# Patient Record
Sex: MALE | Race: WHITE | NOT HISPANIC OR LATINO | Employment: UNEMPLOYED | URBAN - METROPOLITAN AREA
[De-identification: names, ages, dates, MRNs, and addresses within clinical notes are randomized per-mention and may not be internally consistent; named-entity substitution may affect disease eponyms.]

---

## 2023-04-27 ENCOUNTER — OFFICE VISIT (OUTPATIENT)
Dept: FAMILY MEDICINE | Facility: CLINIC | Age: 83
End: 2023-04-27
Payer: COMMERCIAL

## 2023-04-27 VITALS
DIASTOLIC BLOOD PRESSURE: 72 MMHG | HEIGHT: 72 IN | SYSTOLIC BLOOD PRESSURE: 166 MMHG | WEIGHT: 176 LBS | BODY MASS INDEX: 23.84 KG/M2 | RESPIRATION RATE: 16 BRPM | HEART RATE: 76 BPM | TEMPERATURE: 97.9 F | OXYGEN SATURATION: 98 %

## 2023-04-27 DIAGNOSIS — H54.3 DECREASED VISION IN BOTH EYES: ICD-10-CM

## 2023-04-27 DIAGNOSIS — Z12.5 SCREENING FOR PROSTATE CANCER: ICD-10-CM

## 2023-04-27 DIAGNOSIS — Z13.228 SCREENING FOR ENDOCRINE, NUTRITIONAL, METABOLIC AND IMMUNITY DISORDER: ICD-10-CM

## 2023-04-27 DIAGNOSIS — Z13.21 SCREENING FOR ENDOCRINE, NUTRITIONAL, METABOLIC AND IMMUNITY DISORDER: ICD-10-CM

## 2023-04-27 DIAGNOSIS — Z00.00 ENCOUNTER FOR MEDICARE ANNUAL WELLNESS EXAM: Primary | ICD-10-CM

## 2023-04-27 DIAGNOSIS — N40.1 BENIGN NON-NODULAR PROSTATIC HYPERPLASIA WITH LOWER URINARY TRACT SYMPTOMS: ICD-10-CM

## 2023-04-27 DIAGNOSIS — I10 ESSENTIAL HYPERTENSION: ICD-10-CM

## 2023-04-27 DIAGNOSIS — Z13.29 SCREENING FOR ENDOCRINE, NUTRITIONAL, METABOLIC AND IMMUNITY DISORDER: ICD-10-CM

## 2023-04-27 DIAGNOSIS — Z13.0 SCREENING FOR ENDOCRINE, NUTRITIONAL, METABOLIC AND IMMUNITY DISORDER: ICD-10-CM

## 2023-04-27 LAB
ERYTHROCYTE [DISTWIDTH] IN BLOOD BY AUTOMATED COUNT: 12.8 % (ref 10–15)
HBA1C MFR BLD: 6 % (ref 0–5.6)
HCT VFR BLD AUTO: 46.6 % (ref 40–53)
HGB BLD-MCNC: 14.8 G/DL (ref 13.3–17.7)
MCH RBC QN AUTO: 29 PG (ref 26.5–33)
MCHC RBC AUTO-ENTMCNC: 31.8 G/DL (ref 31.5–36.5)
MCV RBC AUTO: 91 FL (ref 78–100)
PLATELET # BLD AUTO: 112 10E3/UL (ref 150–450)
RBC # BLD AUTO: 5.1 10E6/UL (ref 4.4–5.9)
WBC # BLD AUTO: 5 10E3/UL (ref 4–11)

## 2023-04-27 PROCEDURE — 84153 ASSAY OF PSA TOTAL: CPT | Performed by: FAMILY MEDICINE

## 2023-04-27 PROCEDURE — 99387 INIT PM E/M NEW PAT 65+ YRS: CPT | Performed by: FAMILY MEDICINE

## 2023-04-27 PROCEDURE — 80061 LIPID PANEL: CPT | Performed by: FAMILY MEDICINE

## 2023-04-27 PROCEDURE — 83036 HEMOGLOBIN GLYCOSYLATED A1C: CPT | Performed by: FAMILY MEDICINE

## 2023-04-27 PROCEDURE — 85027 COMPLETE CBC AUTOMATED: CPT | Performed by: FAMILY MEDICINE

## 2023-04-27 PROCEDURE — 99214 OFFICE O/P EST MOD 30 MIN: CPT | Mod: 25 | Performed by: FAMILY MEDICINE

## 2023-04-27 PROCEDURE — 84443 ASSAY THYROID STIM HORMONE: CPT | Performed by: FAMILY MEDICINE

## 2023-04-27 PROCEDURE — 36415 COLL VENOUS BLD VENIPUNCTURE: CPT | Performed by: FAMILY MEDICINE

## 2023-04-27 PROCEDURE — 80053 COMPREHEN METABOLIC PANEL: CPT | Performed by: FAMILY MEDICINE

## 2023-04-27 RX ORDER — DOXAZOSIN 2 MG/1
1 TABLET ORAL AT BEDTIME
COMMUNITY
Start: 2023-02-15 | End: 2023-04-27 | Stop reason: ALTCHOICE

## 2023-04-27 RX ORDER — AMLODIPINE BESYLATE 5 MG/1
1 TABLET ORAL EVERY MORNING
COMMUNITY
Start: 2023-02-15 | End: 2023-04-28

## 2023-04-27 RX ORDER — FINASTERIDE 5 MG/1
1 TABLET, FILM COATED ORAL EVERY MORNING
COMMUNITY
Start: 2023-02-15 | End: 2023-05-25

## 2023-04-27 RX ORDER — TAMSULOSIN HYDROCHLORIDE 0.4 MG/1
0.4 CAPSULE ORAL DAILY
Qty: 90 CAPSULE | Refills: 1 | Status: SHIPPED | OUTPATIENT
Start: 2023-04-27 | End: 2023-07-24

## 2023-04-27 ASSESSMENT — ENCOUNTER SYMPTOMS
HEMATOCHEZIA: 0
SORE THROAT: 0
ABDOMINAL PAIN: 0
MYALGIAS: 1
JOINT SWELLING: 0
PALPITATIONS: 0
NAUSEA: 0
HEADACHES: 0
WEAKNESS: 0
ARTHRALGIAS: 0
DIZZINESS: 0
PARESTHESIAS: 0
CHILLS: 0
FREQUENCY: 1
COUGH: 0
NERVOUS/ANXIOUS: 0
EYE PAIN: 0
HEARTBURN: 0
CONSTIPATION: 0
DIARRHEA: 0
HEMATURIA: 0
DYSURIA: 0

## 2023-04-27 ASSESSMENT — ACTIVITIES OF DAILY LIVING (ADL): CURRENT_FUNCTION: NO ASSISTANCE NEEDED

## 2023-04-27 ASSESSMENT — PATIENT HEALTH QUESTIONNAIRE - PHQ9
SUM OF ALL RESPONSES TO PHQ QUESTIONS 1-9: 4
10. IF YOU CHECKED OFF ANY PROBLEMS, HOW DIFFICULT HAVE THESE PROBLEMS MADE IT FOR YOU TO DO YOUR WORK, TAKE CARE OF THINGS AT HOME, OR GET ALONG WITH OTHER PEOPLE: NOT DIFFICULT AT ALL
SUM OF ALL RESPONSES TO PHQ QUESTIONS 1-9: 4

## 2023-04-27 NOTE — PATIENT INSTRUCTIONS
Patient Education   Personalized Prevention Plan  You are due for the preventive services outlined below.  Your care team is available to assist you in scheduling these services.  If you have already completed any of these items, please share that information with your care team to update in your medical record.  Health Maintenance Due   Topic Date Due     ANNUAL REVIEW OF HM ORDERS  Never done     Diptheria Tetanus Pertussis (DTAP/TDAP/TD) Vaccine (1 - Tdap) Never done     Zoster (Shingles) Vaccine (1 of 2) Never done     Pneumococcal Vaccine (1 - PCV) Never done     COVID-19 Vaccine (4 - Booster for Moderna series) 01/25/2022     Flu Vaccine (1) Never done       Urinary Incontinence (Male)  We understand that gender is a spectrum. We may use gendered terms to talk about anatomy and health risk. Please use this sheet in a way that works best for you and your provider as you talk about your care.     Urinary incontinence means not being able to control the release of urine from the bladder.   Causes  Common causes of urinary incontinence in men include:    Infection    Certain medicines    Aging    Poor pelvic muscle tone    Bladder spasms    Obesity    Enlarged prostate    Trouble urinating and fully emptying the bladder (urinary retention)  Other things that can cause incontinence are:     Nervous system diseases    Diabetes    Sleep apnea    Urinary tract infections    Prostate surgery    Pelvic injury  Constipation and smoking have also been identified as risk factors.   Symptoms    Urge incontinence (overactive bladder). This is a sudden urge to urinate. It occurs even though there may not be much urine in the bladder. The need to urinate often during the night is common. It's due to overactive bladder muscles.    Stress incontinence. This is urine leakage that you can't control. It can occur with sneezing, coughing, and other actions that put stress on the bladder.    Treatment  Treatment depends on what is  causing the condition. Bladder infections are treated with antibiotics. Urinary retention is treated with a bladder catheter.   Home care  Follow these guidelines when caring for yourself at home:    Don't have any foods and drinks that may irritate the bladder. This includes:  ? Chocolate  ? Alcohol  ? Caffeine  ? Carbonated drinks  ? Acidic fruits and juices    Limit fluids to 6 to 8 cups a day.    Lose weight if you are overweight. This may reduce your symptoms.    If advised, do regular pelvic muscle-strengthening exercises such as Kegel exercises.    If needed, wear absorbent pads to catch urine. Change the pads often. This is for good hygiene and to prevent skin and bladder infections.    Bathe daily for good hygiene.    If an antibiotic was prescribed to treat a bladder infection, take it until it's finished. Keep taking it even if you are feeling better. This is to make sure your infection has cleared.    If a catheter was left in place, keep bacteria from getting into the collection bag. Don't disconnect the catheter from the collection bag.    Use a leg band to secure the catheter drainage tube, so it does not pull on the catheter. Drain the collection bag when it becomes full. To do this, use the drain spout at the bottom of the bag. Don't disconnect the bag from the catheter.    Don't pull on or try to remove a catheter. The catheter must be removed by a healthcare provider.    If you smoke, stop. Ask your provider for help if you can't do this on your own.  Follow-up care  Follow up with your healthcare provider, or as advised.  When to get medical advice  Call your healthcare provider right away if any of these occur:     Fever over 100.4 F (38 C), or as directed by your provider    Bladder pain or fullness    Belly swelling, nausea, or vomiting    Back pain    Weakness, dizziness, or fainting    If a catheter was left in place, return if:  ? The catheter falls out  ? The catheter stops draining for 6  hours  ? Your urine gets cloudy or smells bad  Gustavo last reviewed this educational content on 6/1/2022 2000-2022 [employer]. All rights reserved. This information is not intended as a substitute for professional medical care. Always follow your healthcare professional's instructions.

## 2023-04-27 NOTE — PROGRESS NOTES
"SUBJECTIVE:   Rambo is a 82 year old who presents for Preventive Visit.      4/27/2023    10:22 AM   Additional Questions   Roomed by Ana Rosa RECIO   Accompanied by self     Patient has been advised of split billing requirements and indicates understanding: Yes  Are you in the first 12 months of your Medicare coverage?  Yes,  Visual Acuity:  Right Eye: 10/50   Left Eye: 10/63  Both Eyes: 10/32    Healthy Habits:     In general, how would you rate your overall health?  Good    Frequency of exercise:  6-7 days/week    Duration of exercise:  45-60 minutes    Do you usually eat at least 4 servings of fruit and vegetables a day, include whole grains    & fiber and avoid regularly eating high fat or \"junk\" foods?  Yes    Taking medications regularly:  Yes    Medication side effects:  None    Ability to successfully perform activities of daily living:  No assistance needed    Home Safety:  No safety concerns identified    Hearing Impairment:  No hearing concerns    In the past 6 months, have you been bothered by leaking of urine? Yes    In general, how would you rate your overall mental or emotional health?  Good      PHQ-2 Total Score: 2    Additional concerns today:  No      Have you ever done Advance Care Planning? (For example, a Health Directive, POLST, or a discussion with a medical provider or your loved ones about your wishes): No, advance care planning information given to patient to review.  Patient declined advance care planning discussion at this time.    Hearing: no hearing loss  Fall risk  Fallen 2 or more times in the past year?: No  Any fall with injury in the past year?: No    Cognitive Screening   1) Repeat 3 items (Leader, Season, Table)    2) Clock draw: NORMAL  3) 3 item recall: Recalls 3 objects  Results: 3 items recalled: COGNITIVE IMPAIRMENT LESS LIKELY    Mini-CogTM Copyright S William. Licensed by the author for use in [facility]; reprinted with permission (flakito@.[facility]). All rights " reserved.      Do you have sleep apnea, excessive snoring or daytime drowsiness?: no    Reviewed and updated as needed this visit by clinical staff   Tobacco  Allergies  Meds              Reviewed and updated as needed this visit by Provider                 Social History     Tobacco Use     Smoking status: Never     Smokeless tobacco: Never   Vaping Use     Vaping status: Never Used     Passive vaping exposure: Yes   Substance Use Topics     Alcohol use: Not on file             4/27/2023    10:10 AM   Alcohol Use   Prescreen: >3 drinks/day or >7 drinks/week? No     Do you have a current opioid prescription? No  Do you use any other controlled substances or medications that are not prescribed by a provider? None        PROBLEMS TO ADD ON...  New patient, French-speaking, relocated from [facility], overall healthy, no prior surgery, current medication included doxazosin and finasteride for hypertrophic prostate, amlodipine 5 mg for hypertension has been on this regimen for several years.  Has noticed increased urination, waking up 4-5 times a night, no burning sensation, decreased vision left greater than right.  Current providers sharing in care for this patient include:   No care team member to display    The following health maintenance items are reviewed in Epic and correct as of today:  Health Maintenance   Topic Date Due     ANNUAL REVIEW OF HM ORDERS  Never done     DTAP/TDAP/TD IMMUNIZATION (1 - Tdap) Never done     ZOSTER IMMUNIZATION (1 of 2) Never done     Pneumococcal Vaccine: 65+ Years (1 - PCV) Never done     COVID-19 Vaccine (4 - Booster for Moderna series) 01/25/2022     INFLUENZA VACCINE (1) Never done     MEDICARE ANNUAL WELLNESS VISIT  04/27/2024     FALL RISK ASSESSMENT  04/27/2024     ADVANCE CARE PLANNING  04/27/2028     PHQ-2 (once per calendar year)  Completed     IPV IMMUNIZATION  Aged Out     MENINGITIS IMMUNIZATION  Aged Out     Lab work is in process  Labs reviewed in EPIC  BP  Readings from Last 3 Encounters:   04/27/23 (!) 166/72    Wt Readings from Last 3 Encounters:   04/27/23 79.8 kg (176 lb)                  Patient Active Problem List   Diagnosis     Benign non-nodular prostatic hyperplasia with lower urinary tract symptoms     Essential hypertension     No past surgical history on file.    Social History     Tobacco Use     Smoking status: Never     Smokeless tobacco: Never   Vaping Use     Vaping status: Never Used     Passive vaping exposure: Yes   Substance Use Topics     Alcohol use: Not on file     No family history on file.      Current Outpatient Medications   Medication Sig Dispense Refill     amLODIPine (NORVASC) 5 MG tablet Take 1 tablet by mouth every morning       finasteride (PROSCAR) 5 MG tablet Take 1 tablet by mouth every morning       tamsulosin (FLOMAX) 0.4 MG capsule Take 1 capsule (0.4 mg) by mouth daily 90 capsule 1     No Known Allergies  Recent Labs   Lab Test 04/27/23  1129   A1C 6.0*   *   HDL 48   TRIG 176*   ALT 20   CR 0.99   GFRESTIMATED 77   POTASSIUM 4.2   TSH 0.89          Review of Systems   Constitutional: Negative for chills.   HENT: Negative for congestion, ear pain, hearing loss and sore throat.    Eyes: Positive for visual disturbance. Negative for pain.   Respiratory: Negative for cough.    Cardiovascular: Negative for chest pain, palpitations and peripheral edema.   Gastrointestinal: Negative for abdominal pain, constipation, diarrhea, heartburn, hematochezia and nausea.   Genitourinary: Positive for frequency, impotence and urgency. Negative for dysuria, genital sores and hematuria.   Musculoskeletal: Positive for myalgias. Negative for arthralgias and joint swelling.   Skin: Negative for rash.   Neurological: Negative for dizziness, weakness, headaches and paresthesias.   Psychiatric/Behavioral: Negative for mood changes. The patient is not nervous/anxious.          OBJECTIVE:   BP (!) 166/72 (BP Location: Left arm, Patient Position:  "Sitting, Cuff Size: Adult Large)   Pulse 76   Temp 97.9  F (36.6  C) (Temporal)   Resp 16   Ht 1.82 m (5' 11.65\")   Wt 79.8 kg (176 lb)   SpO2 98%   BMI 24.10 kg/m   Estimated body mass index is 24.1 kg/m  as calculated from the following:    Height as of this encounter: 1.82 m (5' 11.65\").    Weight as of this encounter: 79.8 kg (176 lb).  Physical Exam  GENERAL: healthy, alert and no distress  EYES: PERRL and moderate lens opacification left, mild right.  HENT: ear canals and TM's normal, nose and mouth without ulcers or lesions  NECK: no adenopathy, no asymmetry, masses, or scars and thyroid normal to palpation  RESP: lungs clear to auscultation - no rales, rhonchi or wheezes  CV: regular rate and rhythm, normal S1 S2, no S3 or S4, no murmur, click or rub, no peripheral edema and peripheral pulses strong  ABDOMEN: soft, nontender, no hepatosplenomegaly, no masses and bowel sounds normal   (male): normal male genitalia without lesions or urethral discharge, no hernia  RECTAL: normal sphincter tone, no rectal masses and moderate prostate enlargement, but otherwise soft, symmetrical with no palpable nodules.  MS: no gross musculoskeletal defects noted, no edema  SKIN: no suspicious lesions or rashes  NEURO: Normal strength and tone, mentation intact and speech normal  PSYCH: mentation appears normal, affect normal/bright    Diagnostic Test Results:  Labs reviewed in Epic    ASSESSMENT / PLAN:   (Z00.00) Encounter for Medicare annual wellness exam  (primary encounter diagnosis)  Comment:   Plan:     (I10) Essential hypertension  Comment:   Plan: **Comprehensive metabolic panel FUTURE 2mo,         **CBC with platelets FUTURE 2mo, **TSH with         free T4 reflex FUTURE 2mo, Albumin Random Urine        Quantitative with Creat Ratio, UA Macroscopic         with reflex to Microscopic and Culture            (N40.1) Benign non-nodular prostatic hyperplasia with lower urinary tract symptoms  Comment:   Plan: " tamsulosin (FLOMAX) 0.4 MG capsule            (Z12.5) Screening for prostate cancer  Comment:   Plan: PSA, tumor marker            (Z13.29,  Z13.21,  Z13.228,  Z13.0) Screening for endocrine, nutritional, metabolic and immunity disorder  Comment:   Plan: Lipid panel reflex to direct LDL Fasting,         **Hemoglobin A1c FUTURE 3mo            (H54.3) Decreased vision in both eyes  Comment:   Plan: Adult Eye  Referral          Benign prostatic hypertrophy with LUTS symptoms, trial of Flomax discussed and prescribed stop doxazosin, continue finasteride.  Consider referral to urologist.  Uncontrolled hypertension, increase amlodipine to total of 10 mg daily, monitor at home recheck here in about 4 to 6 weeks.  Decreased vision with cataract left greater than right, refer to ophthalmologist.  Age-appropriate immunization discussed, including Tdap, pneumonia, influenza etc.        Patient has been advised of split billing requirements and indicates understanding: Yes      COUNSELING:  Reviewed preventive health counseling, as reflected in patient instructions       Regular exercise       Healthy diet/nutrition       Vision screening       Hearing screening       Dental care       Bladder control        He reports that he has never smoked. He has never used smokeless tobacco.      Appropriate preventive services were discussed with this patient, including applicable screening as appropriate for cardiovascular disease, diabetes, osteopenia/osteoporosis, and glaucoma.  As appropriate for age/gender, discussed screening for colorectal cancer, prostate cancer, breast cancer, and cervical cancer. Checklist reviewing preventive services available has been given to the patient.    Reviewed patients plan of care and provided an AVS. The Intermediate Care Plan ( asthma action plan, low back pain action plan, and migraine action plan) for Rambo meets the Care Plan requirement. This Care Plan has been established and  reviewed with the Patient.          Boy Toro MD  [facility]    Identified Health Risks:    I have reviewed Opioid Use Disorder and Substance Use Disorder risk factors and made any needed referrals.     Answers for HPI/ROS submitted by the patient on 4/27/2023  If you checked off any problems, how difficult have these problems made it for you to do your work, take care of things at home, or get along with other people?: Not difficult at all  PHQ9 TOTAL SCORE: 4

## 2023-04-28 LAB
ALBUMIN SERPL BCG-MCNC: 4.3 G/DL (ref 3.5–5.2)
ALP SERPL-CCNC: 61 U/L (ref 40–129)
ALT SERPL W P-5'-P-CCNC: 20 U/L (ref 10–50)
ANION GAP SERPL CALCULATED.3IONS-SCNC: 9 MMOL/L (ref 7–15)
AST SERPL W P-5'-P-CCNC: 23 U/L (ref 10–50)
BILIRUB SERPL-MCNC: 0.4 MG/DL
BUN SERPL-MCNC: 12 MG/DL (ref 8–23)
CALCIUM SERPL-MCNC: 9.6 MG/DL (ref 8.8–10.2)
CHLORIDE SERPL-SCNC: 103 MMOL/L (ref 98–107)
CHOLEST SERPL-MCNC: 212 MG/DL
CREAT SERPL-MCNC: 0.99 MG/DL (ref 0.67–1.17)
DEPRECATED HCO3 PLAS-SCNC: 28 MMOL/L (ref 22–29)
GFR SERPL CREATININE-BSD FRML MDRD: 77 ML/MIN/1.73M2
GLUCOSE SERPL-MCNC: 113 MG/DL (ref 70–99)
HDLC SERPL-MCNC: 48 MG/DL
LDLC SERPL CALC-MCNC: 129 MG/DL
NONHDLC SERPL-MCNC: 164 MG/DL
POTASSIUM SERPL-SCNC: 4.2 MMOL/L (ref 3.4–5.3)
PROT SERPL-MCNC: 7.8 G/DL (ref 6.4–8.3)
PSA SERPL DL<=0.01 NG/ML-MCNC: 2.91 NG/ML
SODIUM SERPL-SCNC: 140 MMOL/L (ref 136–145)
TRIGL SERPL-MCNC: 176 MG/DL
TSH SERPL DL<=0.005 MIU/L-ACNC: 0.89 UIU/ML (ref 0.3–4.2)

## 2023-04-28 RX ORDER — AMLODIPINE BESYLATE 10 MG/1
10 TABLET ORAL DAILY
Qty: 90 TABLET | Refills: 3 | Status: SHIPPED | OUTPATIENT
Start: 2023-04-28 | End: 2024-02-15

## 2023-05-01 ENCOUNTER — TELEPHONE (OUTPATIENT)
Dept: FAMILY MEDICINE | Facility: CLINIC | Age: 83
End: 2023-05-01
Payer: COMMERCIAL

## 2023-05-01 NOTE — TELEPHONE ENCOUNTER
General Call    Contacts       Type Contact Phone/Fax    05/01/2023 11:52 AM CDT Phone (Incoming) Rambo Monique (Self) [phone] (H)        Reason for Call:  Had some questions     What are your questions or concerns:  Pt stated he got some recommendations, blood test. Pt was asking if there is anything else that needs to be done like some type of examation or like a shot. Please advise     Date of last appointment with provider: 04/27/23    [facility] we [facility] or would you prefer to receive a phone call?:   Patient would prefer a phone call     Okay to leave a detailed message?: Yes at Home number on file [phone] (home)

## 2023-05-21 ENCOUNTER — HEALTH MAINTENANCE LETTER (OUTPATIENT)
Age: 83
End: 2023-05-21

## 2023-05-25 ENCOUNTER — MYC REFILL (OUTPATIENT)
Dept: FAMILY MEDICINE | Facility: CLINIC | Age: 83
End: 2023-05-25
Payer: COMMERCIAL

## 2023-05-25 DIAGNOSIS — N40.1 BENIGN NON-NODULAR PROSTATIC HYPERPLASIA WITH LOWER URINARY TRACT SYMPTOMS: Primary | ICD-10-CM

## 2023-05-26 NOTE — TELEPHONE ENCOUNTER
"Routing refill request to provider for review/approval because:  Medication is reported/historical    Last Written Prescription Date:  4/27/23  Last Fill Quantity: ,  # refills:    Last office visit provider:  4/27/23     Requested Prescriptions   Pending Prescriptions Disp Refills     finasteride (PROSCAR) 5 MG tablet       Sig: Take 1 tablet (5 mg) by mouth every morning       BPH Agents Passed - 5/25/2023  7:39 PM        Passed - Recent (12 mo) or future (30 days) visit within the authorizing provider's department     Patient has had an office visit with the authorizing provider or a provider within the authorizing providers department within the previous 12 mos or has a future within next 30 days. See \"Patient Info\" tab in inbasket, or \"Choose Columns\" in Meds & Orders section of the refill encounter.              Passed - Medication is active on med list        Passed - Patient is 18 years of age or older             María Nicole RN 05/26/23 4:26 PM  "

## 2023-05-30 RX ORDER — FINASTERIDE 5 MG/1
1 TABLET, FILM COATED ORAL EVERY MORNING
Qty: 90 TABLET | Refills: 3 | Status: SHIPPED | OUTPATIENT
Start: 2023-05-30 | End: 2024-02-15

## 2023-07-24 ENCOUNTER — OFFICE VISIT (OUTPATIENT)
Dept: FAMILY MEDICINE | Facility: CLINIC | Age: 83
End: 2023-07-24
Payer: COMMERCIAL

## 2023-07-24 VITALS
HEART RATE: 65 BPM | HEIGHT: 72 IN | SYSTOLIC BLOOD PRESSURE: 138 MMHG | RESPIRATION RATE: 16 BRPM | WEIGHT: 175 LBS | OXYGEN SATURATION: 98 % | TEMPERATURE: 97.7 F | DIASTOLIC BLOOD PRESSURE: 72 MMHG | BODY MASS INDEX: 23.7 KG/M2

## 2023-07-24 DIAGNOSIS — H25.9 AGE-RELATED CATARACT OF BOTH EYES, UNSPECIFIED AGE-RELATED CATARACT TYPE: ICD-10-CM

## 2023-07-24 DIAGNOSIS — Z01.818 PREOP GENERAL PHYSICAL EXAM: Primary | ICD-10-CM

## 2023-07-24 DIAGNOSIS — N40.1 BENIGN NON-NODULAR PROSTATIC HYPERPLASIA WITH LOWER URINARY TRACT SYMPTOMS: ICD-10-CM

## 2023-07-24 DIAGNOSIS — I10 ESSENTIAL HYPERTENSION: ICD-10-CM

## 2023-07-24 DIAGNOSIS — D69.6 THROMBOCYTOPENIA (H): ICD-10-CM

## 2023-07-24 LAB
ALBUMIN SERPL BCG-MCNC: 4.3 G/DL (ref 3.5–5.2)
ALBUMIN UR-MCNC: NEGATIVE MG/DL
ALP SERPL-CCNC: 65 U/L (ref 40–129)
ALT SERPL W P-5'-P-CCNC: 21 U/L (ref 0–70)
ANION GAP SERPL CALCULATED.3IONS-SCNC: 9 MMOL/L (ref 7–15)
APPEARANCE UR: CLEAR
AST SERPL W P-5'-P-CCNC: 26 U/L (ref 0–45)
ATRIAL RATE - MUSE: 60 BPM
BASOPHILS # BLD AUTO: 0 10E3/UL (ref 0–0.2)
BASOPHILS NFR BLD AUTO: 1 %
BILIRUB SERPL-MCNC: 0.4 MG/DL
BILIRUB UR QL STRIP: NEGATIVE
BUN SERPL-MCNC: 12.9 MG/DL (ref 8–23)
CALCIUM SERPL-MCNC: 9.1 MG/DL (ref 8.8–10.2)
CHLORIDE SERPL-SCNC: 101 MMOL/L (ref 98–107)
COLOR UR AUTO: YELLOW
CREAT SERPL-MCNC: 0.92 MG/DL (ref 0.67–1.17)
CREAT UR-MCNC: 62.6 MG/DL
DEPRECATED HCO3 PLAS-SCNC: 27 MMOL/L (ref 22–29)
DIASTOLIC BLOOD PRESSURE - MUSE: NORMAL MMHG
EOSINOPHIL # BLD AUTO: 0.3 10E3/UL (ref 0–0.7)
EOSINOPHIL NFR BLD AUTO: 6 %
ERYTHROCYTE [DISTWIDTH] IN BLOOD BY AUTOMATED COUNT: 13.2 % (ref 10–15)
GFR SERPL CREATININE-BSD FRML MDRD: 84 ML/MIN/1.73M2
GLUCOSE SERPL-MCNC: 100 MG/DL (ref 70–99)
GLUCOSE UR STRIP-MCNC: NEGATIVE MG/DL
HCT VFR BLD AUTO: 45.4 % (ref 40–53)
HGB BLD-MCNC: 14.9 G/DL (ref 13.3–17.7)
HGB UR QL STRIP: NEGATIVE
IMM GRANULOCYTES # BLD: 0 10E3/UL
IMM GRANULOCYTES NFR BLD: 0 %
INTERPRETATION ECG - MUSE: NORMAL
KETONES UR STRIP-MCNC: NEGATIVE MG/DL
LEUKOCYTE ESTERASE UR QL STRIP: NEGATIVE
LYMPHOCYTES # BLD AUTO: 2 10E3/UL (ref 0.8–5.3)
LYMPHOCYTES NFR BLD AUTO: 40 %
MCH RBC QN AUTO: 29.3 PG (ref 26.5–33)
MCHC RBC AUTO-ENTMCNC: 32.8 G/DL (ref 31.5–36.5)
MCV RBC AUTO: 89 FL (ref 78–100)
MICROALBUMIN UR-MCNC: <12 MG/L
MICROALBUMIN/CREAT UR: NORMAL MG/G{CREAT}
MONOCYTES # BLD AUTO: 0.5 10E3/UL (ref 0–1.3)
MONOCYTES NFR BLD AUTO: 10 %
NEUTROPHILS # BLD AUTO: 2.2 10E3/UL (ref 1.6–8.3)
NEUTROPHILS NFR BLD AUTO: 43 %
NITRATE UR QL: NEGATIVE
NRBC # BLD AUTO: 0 10E3/UL
NRBC BLD AUTO-RTO: 0 /100
P AXIS - MUSE: 43 DEGREES
PH UR STRIP: 7.5 [PH] (ref 5–8)
PLATELET # BLD AUTO: 130 10E3/UL (ref 150–450)
POTASSIUM SERPL-SCNC: 4 MMOL/L (ref 3.4–5.3)
PR INTERVAL - MUSE: 200 MS
PROT SERPL-MCNC: 7.7 G/DL (ref 6.4–8.3)
QRS DURATION - MUSE: 100 MS
QT - MUSE: 418 MS
QTC - MUSE: 418 MS
R AXIS - MUSE: 22 DEGREES
RBC # BLD AUTO: 5.09 10E6/UL (ref 4.4–5.9)
RETICS # AUTO: 0.08 10E6/UL (ref 0.01–0.11)
RETICS/RBC NFR AUTO: 1.6 % (ref 0.8–2.7)
SODIUM SERPL-SCNC: 137 MMOL/L (ref 136–145)
SP GR UR STRIP: 1.01 (ref 1–1.03)
SYSTOLIC BLOOD PRESSURE - MUSE: NORMAL MMHG
T AXIS - MUSE: 29 DEGREES
UROBILINOGEN UR STRIP-ACNC: 0.2 E.U./DL
VENTRICULAR RATE- MUSE: 60 BPM
WBC # BLD AUTO: 5 10E3/UL (ref 4–11)

## 2023-07-24 PROCEDURE — 99207 BLOOD MORPHOLOGY PATHOLOGIST REVIEW: CPT | Performed by: PATHOLOGY

## 2023-07-24 PROCEDURE — 80053 COMPREHEN METABOLIC PANEL: CPT | Performed by: FAMILY MEDICINE

## 2023-07-24 PROCEDURE — 82043 UR ALBUMIN QUANTITATIVE: CPT | Performed by: FAMILY MEDICINE

## 2023-07-24 PROCEDURE — 90714 TD VACC NO PRESV 7 YRS+ IM: CPT | Performed by: FAMILY MEDICINE

## 2023-07-24 PROCEDURE — 93010 ELECTROCARDIOGRAM REPORT: CPT | Performed by: INTERNAL MEDICINE

## 2023-07-24 PROCEDURE — 90471 IMMUNIZATION ADMIN: CPT | Performed by: FAMILY MEDICINE

## 2023-07-24 PROCEDURE — 99215 OFFICE O/P EST HI 40 MIN: CPT | Mod: 25 | Performed by: FAMILY MEDICINE

## 2023-07-24 PROCEDURE — 82570 ASSAY OF URINE CREATININE: CPT | Performed by: FAMILY MEDICINE

## 2023-07-24 PROCEDURE — 85025 COMPLETE CBC W/AUTO DIFF WBC: CPT | Performed by: FAMILY MEDICINE

## 2023-07-24 PROCEDURE — 81003 URINALYSIS AUTO W/O SCOPE: CPT | Performed by: FAMILY MEDICINE

## 2023-07-24 PROCEDURE — 85045 AUTOMATED RETICULOCYTE COUNT: CPT | Performed by: FAMILY MEDICINE

## 2023-07-24 PROCEDURE — 93005 ELECTROCARDIOGRAM TRACING: CPT | Performed by: FAMILY MEDICINE

## 2023-07-24 PROCEDURE — 36415 COLL VENOUS BLD VENIPUNCTURE: CPT | Performed by: FAMILY MEDICINE

## 2023-07-24 RX ORDER — TAMSULOSIN HYDROCHLORIDE 0.4 MG/1
0.4 CAPSULE ORAL DAILY
Qty: 90 CAPSULE | Refills: 1 | Status: SHIPPED | OUTPATIENT
Start: 2023-07-24 | End: 2024-02-15

## 2023-07-25 ENCOUNTER — PATIENT OUTREACH (OUTPATIENT)
Dept: ONCOLOGY | Facility: CLINIC | Age: 83
End: 2023-07-25
Payer: COMMERCIAL

## 2023-07-25 LAB
PATH REPORT.COMMENTS IMP SPEC: NORMAL
PATH REPORT.COMMENTS IMP SPEC: NORMAL
PATH REPORT.FINAL DX SPEC: NORMAL
PATH REPORT.RELEVANT HX SPEC: NORMAL

## 2023-07-25 NOTE — PROGRESS NOTES
Referral received for benign heme services, see below.    Referral reason: Thrombocytopenia    Current abnormal labs: Available in Chart Review    Outreach: Call not placed to patient regarding referral.    Plan: Triage instructions updated and sent to [facility] for completion.

## 2023-08-26 NOTE — Clinical Note
Please fax signed preop note to surgery center. Thank you Patient requests all Lab, Cardiology, and Radiology Results on their Discharge Instructions

## 2023-09-11 NOTE — TELEPHONE ENCOUNTER
RECORDS STATUS - ALL OTHER DIAGNOSIS      RECORDS RECEIVED FROM: [facility]   DATE RECEIVED: 9/11   NOTES STATUS DETAILS   OFFICE NOTE from referring provider Boy Daniels MD in [facility]   MEDICATION LIST [facility] 7/24/23   LABS     ANYTHING RELATED TO DIAGNOSIS Epic 7/24/23

## 2023-09-21 ENCOUNTER — VIRTUAL VISIT (OUTPATIENT)
Dept: TRANSPLANT | Facility: CLINIC | Age: 83
End: 2023-09-21
Attending: FAMILY MEDICINE
Payer: COMMERCIAL

## 2023-09-21 ENCOUNTER — PRE VISIT (OUTPATIENT)
Dept: TRANSPLANT | Facility: CLINIC | Age: 83
End: 2023-09-21
Payer: COMMERCIAL

## 2023-09-21 VITALS — BODY MASS INDEX: 21.67 KG/M2 | WEIGHT: 160 LBS | HEIGHT: 72 IN

## 2023-09-21 DIAGNOSIS — D69.6 THROMBOCYTOPENIA (H): ICD-10-CM

## 2023-09-21 PROCEDURE — 99204 OFFICE O/P NEW MOD 45 MIN: CPT | Mod: VID | Performed by: INTERNAL MEDICINE

## 2023-09-21 ASSESSMENT — PAIN SCALES - GENERAL: PAINLEVEL: NO PAIN (0)

## 2023-09-21 NOTE — LETTER
2023         RE: Rambo Monique  [address]  [place] [address]        Dear Colleague,    Thank you for referring your patient, Rambo Monique, to the [facility] [place]. Please see a copy of my visit note below.    Virtual Visit Details    Type of service:  Video Visit   Video Start Time: 3:58 PM  Video End Time:4:07 PM    Originating Location (pt. Location): Home  Distant Location (provider location):  On-site  Platform used for Video Visit: [facility]  HEMATOLOGY CONSULTATION    Rambo Monique   : 1940   MRN: [id number]  Date of service: Sep 21, 2023     REASON FOR CONSULTATION:  We are asked by Dr. Boy Toro to evaluate Rambo Monique for concern for thrombocytopenia.    HISTORY OF PRESENT ILLNESS:  Rambo Monique is a 82 year old man with a history of BPH, HTN, cataracts, who has had chronic thrombocytopenia. He is going to get a cataract surgery and a referral was made for our opinion regarding the thrombocytopenia    Per chart review and discussion with the patient, his earliest labs in the system are from [facility] and go back to . He has had low platelets 149 in  and . 117 in 2018, 130s in 2019. As low as 105 in 2021. Plts 113 in . In , he has had labs through , plts 112 In 2023 and 130 in 2023. The remainder of his CBC has been normal, with a normal retic 0.079 and an unremarkable smear. He acknowledges that he has known about the platelets for the last few years but doesn't know why.     He denies epistaxis, gum bleeding, hematemesis, hemoptysis, hematochezia, melena, hematuria.     He has not had excessive bleeding with dental procedures. He has had both cataract surgeries in August and they went very well. He is able to see again.    Diet includes meat, vegetables, dairy, and fruit. He has no dietary restrictions.      He denies fevers, chills, night sweats, weight loss, headache, chest pain, shortness of breath, nausea, vomiting, abdominal pain, constipation, diarrhea, no new weakness, numbness, peripheral neuropathy.    He is independent at home and is able to take care of his ADLs.     REVIEW OF SYSTEMS  A 10 point review of systems was performed and was otherwise negative except as mentioned in the HPI.     PAST MEDICAL HISTORY  No past medical history on file.  PAST SURGICAL HISTORY  No past surgical history on file.  SOCIAL HISTORY  Reviewed, and any changes made accordingly  Social History     Socioeconomic History    Marital status:      Spouse name: Not on file    Number of children: Not on file    Years of education: Not on file    Highest education level: Not on file   Occupational History    Not on file   Tobacco Use    Smoking status: Never    Smokeless tobacco: Never   Vaping Use    Vaping Use: Never used   Substance and Sexual Activity    Alcohol use: Not on file    Drug use: Not on file    Sexual activity: Not on file   Other Topics Concern    Not on file   Social History Narrative    Not on file     Social Determinants of Health     Financial Resource Strain: Not on file   Food Insecurity: Not on file   Transportation Needs: Not on file   Physical Activity: Not on file   Stress: Not on file   Social Connections: Not on file   Interpersonal Safety: Not on file   Housing Stability: Not on file   No taobacco no EtOH.    FAMILY HISTORY  Reviewed, and any changes made accordingly. No known family history of cancer or bleeding.  No family history on file.    MEDICATIONS  Current Outpatient Medications   Medication    amLODIPine (NORVASC) 10 MG tablet    finasteride (PROSCAR) 5 MG tablet    tamsulosin (FLOMAX) 0.4 MG capsule     No current facility-administered medications for this visit.     ALLERGIES  No Known Allergies    PHYSICAL EXAM  There were no vitals taken for this visit.   Wt Readings from Last 10  Encounters:   07/24/23 79.4 kg (175 lb)   04/27/23 79.8 kg (176 lb)     General: Well appearing.  HEENT: Sclerae anicteric.  Lungs: Breathing comfortably. No cough.  MSK: Grossly normal movement.  Neuro: Grossly non-focal.  Skin/access: Normal skin tone.  Psych: Alert and oriented. No distress.  Performance status: ECOG 0    LABS  Recent Labs   Lab Test 07/24/23  1159   WBC 5.0   HGB 14.9   *   MCV 89   RDW 13.2     Recent Labs   Lab Test 07/24/23  1159      POTASSIUM 4.0   CHLORIDE 101   CO2 27   BUN 12.9   CR 0.92   MARLENA 9.1     Recent Labs   Lab Test 07/24/23  1159 04/27/23  1129   AST 26 23   ALT 21 20   ALKPHOS 65 61   ALBUMIN 4.3 4.3   PROTTOTAL 7.7 7.8   BILITOTAL 0.4 0.4      No results for input(s): IGA, IGM, IGE, ELPM, ELPINT, IEP, KAPPAFREELT, LAMBDAFREELT, KLR in the last 25266 hours.    Invalid input(s): LGG   Recent Labs   Lab Test 07/24/23  1159   RETICABSCT 0.079   RETP 1.6     No results for input(s): MORPH in the last 96887 hours.  No results for input(s): HCVAB, HCABC, HBCAB, AUSAB, HIV in the last 31337 hours.  No results for input(s): INR, PTT, FIBR in the last 72860 hours.     IMAGING  As mentioned above in HPI.    LADI Monique is a 82 year old man with a history as above, with the following issues:  Chronic mild asymptomatic thrombocytopenia    Differential diagnosis includes a transient thrombocytopenia from any number of causes (but his has been persistent), pseudothrombocytopenia (but smear confirms counts), a mild ITP (diagnosis of exclusion), normal variation, congenital or inherited thrombocytopenia (we don't have any normal plt counts on him available), chronic viral infection (Hep B, C, HIV, EBV, CMV), nutritional deficiency (B12, folate, copper), and lupus or other autoimmunity. He does not have evidence of liver failure, hypersplenism, or chronic alcoholism. It is also possible that it signals the beginnings of a bone marrow failure or bone marrow  infiltrative process, however it has been quite stable over time. A low risk MDS is possible but this would be expectantly/symptomatically managed at his age. A peripheral blood smear was unremarkable.    PLAN  - Check repeat CBC with diff and retic count, B12, folate, copper  - If nutritional labs are normal, no further workup or treatment needed at this time.    We had a long discussion with the patient about the diagnostic possibilities and necessary workup. All questions were answered to their satisfaction.    I spent 50 minutes on the date of service reviewing medical records from the referring provider, reviewing previous lab and imaging results as summarized above, obtaining and reviewing records from [facility] as summarized above, obtaining a history from the patient, performing a physical exam, counseling and educating the patient on the diagnosis and treatment, entering orders for tests, communication with the referring provider, evaluating a problem with uncertain prognosis, coordinating care, and documenting in the electronic medical record.    Thank you for allowing me to participate in the care of this patient. Please do not hesitate to contact me if there are any concerns or questions.     Omar Luis MD   of Medicine  Classical Hematology and Blood and Marrow Transplantation  Division of Hematology, Oncology, and Transplantation  [facility] [phone]

## 2023-09-21 NOTE — PROGRESS NOTES
Virtual Visit Details    Type of service:  Video Visit   Video Start Time: 3:58 PM  Video End Time:4:07 PM    Originating Location (pt. Location): Home  Distant Location (provider location):  On-site  Platform used for Video Visit: [facility]  HEMATOLOGY CONSULTATION    Rambo Monique   : 1940   MRN: [id number]  Date of service: Sep 21, 2023     REASON FOR CONSULTATION:  We are asked by Dr. Boy Toro to evaluate Rambo Monique for concern for thrombocytopenia.    HISTORY OF PRESENT ILLNESS:  Rambo Monique is a 82 year old man with a history of BPH, HTN, cataracts, who has had chronic thrombocytopenia. He is going to get a cataract surgery and a referral was made for our opinion regarding the thrombocytopenia    Per chart review and discussion with the patient, his earliest labs in the system are from [facility] and go back to . He has had low platelets 149 in  and 2016. 117 in , 130s in . As low as 105 in 2021. Plts 113 in . In , he has had labs through [facility], plts 112 In 2023 and 130 in 2023. The remainder of his CBC has been normal, with a normal retic 0.079 and an unremarkable smear. He acknowledges that he has known about the platelets for the last few years but doesn't know why.     He denies epistaxis, gum bleeding, hematemesis, hemoptysis, hematochezia, melena, hematuria.     He has not had excessive bleeding with dental procedures. He has had both cataract surgeries in August and they went very well. He is able to see again.    Diet includes meat, vegetables, dairy, and fruit. He has no dietary restrictions.     He denies fevers, chills, night sweats, weight loss, headache, chest pain, shortness of breath, nausea, vomiting, abdominal pain, constipation, diarrhea, no new weakness, numbness, peripheral neuropathy.    He is independent at home and is able to take care of his ADLs.     REVIEW OF SYSTEMS  A  10 point review of systems was performed and was otherwise negative except as mentioned in the HPI.     PAST MEDICAL HISTORY  No past medical history on file.  PAST SURGICAL HISTORY  No past surgical history on file.  SOCIAL HISTORY  Reviewed, and any changes made accordingly  Social History     Socioeconomic History    Marital status:      Spouse name: Not on file    Number of children: Not on file    Years of education: Not on file    Highest education level: Not on file   Occupational History    Not on file   Tobacco Use    Smoking status: Never    Smokeless tobacco: Never   Vaping Use    Vaping Use: Never used   Substance and Sexual Activity    Alcohol use: Not on file    Drug use: Not on file    Sexual activity: Not on file   Other Topics Concern    Not on file   Social History Narrative    Not on file     Social Determinants of Health     Financial Resource Strain: Not on file   Food Insecurity: Not on file   Transportation Needs: Not on file   Physical Activity: Not on file   Stress: Not on file   Social Connections: Not on file   Interpersonal Safety: Not on file   Housing Stability: Not on file   No taobacco no EtOH.    FAMILY HISTORY  Reviewed, and any changes made accordingly. No known family history of cancer or bleeding.  No family history on file.    MEDICATIONS  Current Outpatient Medications   Medication    amLODIPine (NORVASC) 10 MG tablet    finasteride (PROSCAR) 5 MG tablet    tamsulosin (FLOMAX) 0.4 MG capsule     No current facility-administered medications for this visit.     ALLERGIES  No Known Allergies    PHYSICAL EXAM  There were no vitals taken for this visit.   Wt Readings from Last 10 Encounters:   07/24/23 79.4 kg (175 lb)   04/27/23 79.8 kg (176 lb)     General: Well appearing.  HEENT: Sclerae anicteric.  Lungs: Breathing comfortably. No cough.  MSK: Grossly normal movement.  Neuro: Grossly non-focal.  Skin/access: Normal skin tone.  Psych: Alert and oriented. No  distress.  Performance status: ECOG 0    LABS  Recent Labs   Lab Test 07/24/23  1159   WBC 5.0   HGB 14.9   *   MCV 89   RDW 13.2     Recent Labs   Lab Test 07/24/23  1159      POTASSIUM 4.0   CHLORIDE 101   CO2 27   BUN 12.9   CR 0.92   MARLENA 9.1     Recent Labs   Lab Test 07/24/23  1159 04/27/23  1129   AST 26 23   ALT 21 20   ALKPHOS 65 61   ALBUMIN 4.3 4.3   PROTTOTAL 7.7 7.8   BILITOTAL 0.4 0.4      No results for input(s): IGA, IGM, IGE, ELPM, ELPINT, IEP, KAPPAFREELT, LAMBDAFREELT, KLR in the last 63850 hours.    Invalid input(s): LGG   Recent Labs   Lab Test 07/24/23  1159   RETICABSCT 0.079   RETP 1.6     No results for input(s): MORPH in the last 83576 hours.  No results for input(s): HCVAB, HCABC, HBCAB, AUSAB, HIV in the last 77979 hours.  No results for input(s): INR, PTT, FIBR in the last 56948 hours.     IMAGING  As mentioned above in HPI.    LADI Monique is a 82 year old man with a history as above, with the following issues:  Chronic mild asymptomatic thrombocytopenia    Differential diagnosis includes a transient thrombocytopenia from any number of causes (but his has been persistent), pseudothrombocytopenia (but smear confirms counts), a mild ITP (diagnosis of exclusion), normal variation, congenital or inherited thrombocytopenia (we don't have any normal plt counts on him available), chronic viral infection (Hep B, C, HIV, EBV, CMV), nutritional deficiency (B12, folate, copper), and lupus or other autoimmunity. He does not have evidence of liver failure, hypersplenism, or chronic alcoholism. It is also possible that it signals the beginnings of a bone marrow failure or bone marrow infiltrative process, however it has been quite stable over time. A low risk MDS is possible but this would be expectantly/symptomatically managed at his age. A peripheral blood smear was unremarkable.    PLAN  - Check repeat CBC with diff and retic count, B12, folate, copper  - If nutritional  labs are normal, no further workup or treatment needed at this time.    We had a long discussion with the patient about the diagnostic possibilities and necessary workup. All questions were answered to their satisfaction.    Addendum:  Labs delayed to 11/22/23 but B12, folate, copper, retic normal. No follow up or further workup needed at this time.    I spent 50 minutes on the date of service reviewing medical records from the referring provider, reviewing previous lab and imaging results as summarized above, obtaining and reviewing records from [facility] as summarized above, obtaining a history from the patient, performing a physical exam, counseling and educating the patient on the diagnosis and treatment, entering orders for tests, communication with the referring provider, evaluating a problem with uncertain prognosis, coordinating care, and documenting in the electronic medical record.    Thank you for allowing me to participate in the care of this patient. Please do not hesitate to contact me if there are any concerns or questions.     Omar Luis MD   of Medicine  Classical Hematology and Blood and Marrow Transplantation  Division of Hematology, Oncology, and Transplantation  [facility] [phone]

## 2023-09-21 NOTE — NURSING NOTE
Is the patient currently in the state of [place]? YES    Visit mode:VIDEO    If the visit is dropped, the patient can be reconnected by: VIDEO VISIT: Send to e-mail at: [email or web address]    Will anyone else be joining the visit? NO  (If patient encounters technical issues they should call [phone]    How would you like to obtain your AVS? MyChart    Are changes needed to the allergy or medication list? No    Reason for visit: Video Visit (New apt )    Kristal DUNN

## 2023-11-22 ENCOUNTER — LAB (OUTPATIENT)
Dept: LAB | Facility: CLINIC | Age: 83
End: 2023-11-22
Payer: COMMERCIAL

## 2023-11-22 DIAGNOSIS — D69.6 THROMBOCYTOPENIA (H): ICD-10-CM

## 2023-11-22 LAB
BASOPHILS # BLD AUTO: 0 10E3/UL (ref 0–0.2)
BASOPHILS NFR BLD AUTO: 1 %
EOSINOPHIL # BLD AUTO: 0.3 10E3/UL (ref 0–0.7)
EOSINOPHIL NFR BLD AUTO: 5 %
ERYTHROCYTE [DISTWIDTH] IN BLOOD BY AUTOMATED COUNT: 12.9 % (ref 10–15)
FOLATE SERPL-MCNC: 18 NG/ML (ref 4.6–34.8)
HCT VFR BLD AUTO: 45.9 % (ref 40–53)
HGB BLD-MCNC: 15.5 G/DL (ref 13.3–17.7)
IMM GRANULOCYTES # BLD: 0 10E3/UL
IMM GRANULOCYTES NFR BLD: 0 %
LYMPHOCYTES # BLD AUTO: 2 10E3/UL (ref 0.8–5.3)
LYMPHOCYTES NFR BLD AUTO: 40 %
MCH RBC QN AUTO: 30.2 PG (ref 26.5–33)
MCHC RBC AUTO-ENTMCNC: 33.8 G/DL (ref 31.5–36.5)
MCV RBC AUTO: 89 FL (ref 78–100)
MONOCYTES # BLD AUTO: 0.5 10E3/UL (ref 0–1.3)
MONOCYTES NFR BLD AUTO: 9 %
NEUTROPHILS # BLD AUTO: 2.3 10E3/UL (ref 1.6–8.3)
NEUTROPHILS NFR BLD AUTO: 45 %
NRBC # BLD AUTO: 0 10E3/UL
NRBC BLD AUTO-RTO: 0 /100
PLATELET # BLD AUTO: 123 10E3/UL (ref 150–450)
RBC # BLD AUTO: 5.14 10E6/UL (ref 4.4–5.9)
RETIC HEMOGLOBIN: 33.8 PG (ref 28.2–35.7)
RETICS # AUTO: 0.06 10E6/UL (ref 0.03–0.1)
RETICS/RBC NFR AUTO: 1.3 % (ref 0.5–2)
VIT B12 SERPL-MCNC: 366 PG/ML (ref 232–1245)
WBC # BLD AUTO: 5.1 10E3/UL (ref 4–11)

## 2023-11-22 PROCEDURE — 36415 COLL VENOUS BLD VENIPUNCTURE: CPT | Performed by: PATHOLOGY

## 2023-11-22 PROCEDURE — 85025 COMPLETE CBC W/AUTO DIFF WBC: CPT | Performed by: PATHOLOGY

## 2023-11-22 PROCEDURE — 82525 ASSAY OF COPPER: CPT | Mod: 90 | Performed by: PATHOLOGY

## 2023-11-22 PROCEDURE — 99000 SPECIMEN HANDLING OFFICE-LAB: CPT | Performed by: PATHOLOGY

## 2023-11-22 PROCEDURE — 85046 RETICYTE/HGB CONCENTRATE: CPT | Performed by: PATHOLOGY

## 2023-11-22 PROCEDURE — 82746 ASSAY OF FOLIC ACID SERUM: CPT | Performed by: INTERNAL MEDICINE

## 2023-11-22 PROCEDURE — 82607 VITAMIN B-12: CPT | Performed by: INTERNAL MEDICINE

## 2023-11-24 LAB — COPPER SERPL-MCNC: 88.3 UG/DL

## 2023-11-27 ENCOUNTER — PATIENT OUTREACH (OUTPATIENT)
Dept: ONCOLOGY | Facility: CLINIC | Age: 83
End: 2023-11-27
Payer: COMMERCIAL

## 2023-11-27 NOTE — PROGRESS NOTES
[facility]: Cancer Care                                                                                          Per  patients labs look good He does not need any additional follow up with the Oncology team. [facility] sent my chart to patient with update.   No other follow up or outreaches at this time.       Signature:  Ericka Eagle RN

## 2024-02-15 ENCOUNTER — MYC MEDICAL ADVICE (OUTPATIENT)
Dept: FAMILY MEDICINE | Facility: CLINIC | Age: 84
End: 2024-02-15
Payer: COMMERCIAL

## 2024-02-15 ENCOUNTER — MYC REFILL (OUTPATIENT)
Dept: FAMILY MEDICINE | Facility: CLINIC | Age: 84
End: 2024-02-15
Payer: COMMERCIAL

## 2024-02-15 DIAGNOSIS — N40.1 BENIGN NON-NODULAR PROSTATIC HYPERPLASIA WITH LOWER URINARY TRACT SYMPTOMS: ICD-10-CM

## 2024-02-15 DIAGNOSIS — I10 ESSENTIAL HYPERTENSION: ICD-10-CM

## 2024-02-15 RX ORDER — FINASTERIDE 5 MG/1
1 TABLET, FILM COATED ORAL EVERY MORNING
Qty: 90 TABLET | Refills: 3 | Status: SHIPPED | OUTPATIENT
Start: 2024-02-15

## 2024-02-15 RX ORDER — AMLODIPINE BESYLATE 10 MG/1
10 TABLET ORAL DAILY
Qty: 90 TABLET | Refills: 3 | OUTPATIENT
Start: 2024-02-15

## 2024-02-15 RX ORDER — TAMSULOSIN HYDROCHLORIDE 0.4 MG/1
0.4 CAPSULE ORAL DAILY
Qty: 90 CAPSULE | Refills: 1 | Status: SHIPPED | OUTPATIENT
Start: 2024-02-15

## 2024-02-15 RX ORDER — TAMSULOSIN HYDROCHLORIDE 0.4 MG/1
0.4 CAPSULE ORAL DAILY
Qty: 90 CAPSULE | Refills: 1 | OUTPATIENT
Start: 2024-02-15

## 2024-02-15 RX ORDER — AMLODIPINE BESYLATE 10 MG/1
10 TABLET ORAL DAILY
Qty: 90 TABLET | Refills: 3 | Status: SHIPPED | OUTPATIENT
Start: 2024-02-15

## 2024-02-15 RX ORDER — FINASTERIDE 5 MG/1
1 TABLET, FILM COATED ORAL EVERY MORNING
Qty: 90 TABLET | Refills: 3 | OUTPATIENT
Start: 2024-02-15

## 2024-05-21 ENCOUNTER — MYC REFILL (OUTPATIENT)
Dept: FAMILY MEDICINE | Facility: CLINIC | Age: 84
End: 2024-05-21
Payer: COMMERCIAL

## 2024-05-21 DIAGNOSIS — I10 ESSENTIAL HYPERTENSION: ICD-10-CM

## 2024-05-21 DIAGNOSIS — N40.1 BENIGN NON-NODULAR PROSTATIC HYPERPLASIA WITH LOWER URINARY TRACT SYMPTOMS: ICD-10-CM

## 2024-05-21 RX ORDER — TAMSULOSIN HYDROCHLORIDE 0.4 MG/1
0.4 CAPSULE ORAL DAILY
Qty: 90 CAPSULE | Refills: 1 | OUTPATIENT
Start: 2024-05-21

## 2024-05-21 RX ORDER — AMLODIPINE BESYLATE 10 MG/1
10 TABLET ORAL DAILY
Qty: 90 TABLET | Refills: 3 | OUTPATIENT
Start: 2024-05-21

## 2024-05-21 RX ORDER — FINASTERIDE 5 MG/1
1 TABLET, FILM COATED ORAL EVERY MORNING
Qty: 90 TABLET | Refills: 3 | OUTPATIENT
Start: 2024-05-21

## 2024-06-13 ENCOUNTER — LAB REQUISITION (OUTPATIENT)
Dept: LAB | Facility: CLINIC | Age: 84
End: 2024-06-13

## 2024-06-13 DIAGNOSIS — I10 ESSENTIAL (PRIMARY) HYPERTENSION: ICD-10-CM

## 2024-06-13 DIAGNOSIS — Z13.6 ENCOUNTER FOR SCREENING FOR CARDIOVASCULAR DISORDERS: ICD-10-CM

## 2024-06-13 LAB
ANION GAP SERPL CALCULATED.3IONS-SCNC: 16 MMOL/L (ref 7–15)
BUN SERPL-MCNC: 12.1 MG/DL (ref 8–23)
CALCIUM SERPL-MCNC: 9 MG/DL (ref 8.8–10.2)
CHLORIDE SERPL-SCNC: 108 MMOL/L (ref 98–107)
CHOLEST SERPL-MCNC: 199 MG/DL
CREAT SERPL-MCNC: 1.01 MG/DL (ref 0.67–1.17)
DEPRECATED HCO3 PLAS-SCNC: 24 MMOL/L (ref 22–29)
EGFRCR SERPLBLD CKD-EPI 2021: 74 ML/MIN/1.73M2
FASTING STATUS PATIENT QL REPORTED: ABNORMAL
FASTING STATUS PATIENT QL REPORTED: ABNORMAL
GLUCOSE SERPL-MCNC: 103 MG/DL (ref 70–99)
HDLC SERPL-MCNC: 47 MG/DL
LDLC SERPL CALC-MCNC: 130 MG/DL
NONHDLC SERPL-MCNC: 152 MG/DL
POTASSIUM SERPL-SCNC: 4.1 MMOL/L (ref 3.4–5.3)
SODIUM SERPL-SCNC: 148 MMOL/L (ref 135–145)
TRIGL SERPL-MCNC: 110 MG/DL

## 2024-06-13 PROCEDURE — 80061 LIPID PANEL: CPT | Performed by: FAMILY MEDICINE

## 2024-06-13 PROCEDURE — 80048 BASIC METABOLIC PNL TOTAL CA: CPT | Performed by: FAMILY MEDICINE

## 2024-07-28 ENCOUNTER — HEALTH MAINTENANCE LETTER (OUTPATIENT)
Age: 84
End: 2024-07-28

## 2024-10-17 ENCOUNTER — LAB REQUISITION (OUTPATIENT)
Dept: LAB | Facility: CLINIC | Age: 84
End: 2024-10-17
Payer: COMMERCIAL

## 2024-10-17 DIAGNOSIS — I10 ESSENTIAL (PRIMARY) HYPERTENSION: ICD-10-CM

## 2024-10-17 PROCEDURE — 80048 BASIC METABOLIC PNL TOTAL CA: CPT | Mod: ORL | Performed by: FAMILY MEDICINE

## 2024-10-18 LAB
ANION GAP SERPL CALCULATED.3IONS-SCNC: 13 MMOL/L (ref 7–15)
BUN SERPL-MCNC: 15.4 MG/DL (ref 8–23)
CALCIUM SERPL-MCNC: 9.1 MG/DL (ref 8.8–10.4)
CHLORIDE SERPL-SCNC: 105 MMOL/L (ref 98–107)
CREAT SERPL-MCNC: 0.94 MG/DL (ref 0.67–1.17)
EGFRCR SERPLBLD CKD-EPI 2021: 81 ML/MIN/1.73M2
GLUCOSE SERPL-MCNC: 81 MG/DL (ref 70–99)
HCO3 SERPL-SCNC: 22 MMOL/L (ref 22–29)
POTASSIUM SERPL-SCNC: 4 MMOL/L (ref 3.4–5.3)
SODIUM SERPL-SCNC: 140 MMOL/L (ref 135–145)

## 2025-03-17 DIAGNOSIS — N40.1 BENIGN NON-NODULAR PROSTATIC HYPERPLASIA WITH LOWER URINARY TRACT SYMPTOMS: ICD-10-CM

## 2025-03-17 RX ORDER — FINASTERIDE 5 MG/1
1 TABLET, FILM COATED ORAL EVERY MORNING
Qty: 90 TABLET | Refills: 0 | OUTPATIENT
Start: 2025-03-17

## 2025-06-16 ENCOUNTER — LAB REQUISITION (OUTPATIENT)
Dept: LAB | Facility: CLINIC | Age: 85
End: 2025-06-16
Payer: COMMERCIAL

## 2025-06-16 DIAGNOSIS — I10 ESSENTIAL (PRIMARY) HYPERTENSION: ICD-10-CM

## 2025-06-16 LAB
ANION GAP SERPL CALCULATED.3IONS-SCNC: 11 MMOL/L (ref 7–15)
BUN SERPL-MCNC: 15.1 MG/DL (ref 8–23)
CALCIUM SERPL-MCNC: 9.1 MG/DL (ref 8.8–10.4)
CHLORIDE SERPL-SCNC: 101 MMOL/L (ref 98–107)
CREAT SERPL-MCNC: 1.05 MG/DL (ref 0.67–1.17)
EGFRCR SERPLBLD CKD-EPI 2021: 70 ML/MIN/1.73M2
GLUCOSE SERPL-MCNC: 94 MG/DL (ref 70–99)
HCO3 SERPL-SCNC: 26 MMOL/L (ref 22–29)
POTASSIUM SERPL-SCNC: 4 MMOL/L (ref 3.4–5.3)
SODIUM SERPL-SCNC: 138 MMOL/L (ref 135–145)

## 2025-06-16 PROCEDURE — 80048 BASIC METABOLIC PNL TOTAL CA: CPT | Mod: ORL | Performed by: FAMILY MEDICINE

## 2025-08-10 ENCOUNTER — HEALTH MAINTENANCE LETTER (OUTPATIENT)
Age: 85
End: 2025-08-10